# Patient Record
Sex: FEMALE | Race: BLACK OR AFRICAN AMERICAN | Employment: STUDENT | URBAN - METROPOLITAN AREA
[De-identification: names, ages, dates, MRNs, and addresses within clinical notes are randomized per-mention and may not be internally consistent; named-entity substitution may affect disease eponyms.]

---
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
## 2020-11-05 PROBLEM — Z30.017 NEXPLANON INSERTION: Status: RESOLVED | Noted: 2017-07-13 | Resolved: 2020-11-05

## 2020-11-05 PROBLEM — Z30.46 NEXPLANON REMOVAL: Status: ACTIVE | Noted: 2020-11-05
